# Patient Record
Sex: FEMALE
[De-identification: names, ages, dates, MRNs, and addresses within clinical notes are randomized per-mention and may not be internally consistent; named-entity substitution may affect disease eponyms.]

---

## 2024-08-16 PROBLEM — Z00.00 ENCOUNTER FOR PREVENTIVE HEALTH EXAMINATION: Status: ACTIVE | Noted: 2024-08-16

## 2024-08-20 ENCOUNTER — APPOINTMENT (OUTPATIENT)
Dept: OBGYN | Facility: CLINIC | Age: 74
End: 2024-08-20

## 2024-08-30 PROBLEM — N83.299 COMPLEX OVARIAN CYST: Status: ACTIVE | Noted: 2024-08-30

## 2024-09-05 ENCOUNTER — APPOINTMENT (OUTPATIENT)
Dept: GYNECOLOGIC ONCOLOGY | Facility: CLINIC | Age: 74
End: 2024-09-05
Payer: MEDICARE

## 2024-09-05 VITALS
SYSTOLIC BLOOD PRESSURE: 140 MMHG | OXYGEN SATURATION: 99 % | HEIGHT: 64 IN | BODY MASS INDEX: 25.78 KG/M2 | HEART RATE: 65 BPM | DIASTOLIC BLOOD PRESSURE: 63 MMHG | WEIGHT: 151 LBS

## 2024-09-05 DIAGNOSIS — Z80.3 FAMILY HISTORY OF MALIGNANT NEOPLASM OF BREAST: ICD-10-CM

## 2024-09-05 DIAGNOSIS — R79.89 OTHER SPECIFIED ABNORMAL FINDINGS OF BLOOD CHEMISTRY: ICD-10-CM

## 2024-09-05 DIAGNOSIS — Z80.1 FAMILY HISTORY OF MALIGNANT NEOPLASM OF TRACHEA, BRONCHUS AND LUNG: ICD-10-CM

## 2024-09-05 PROCEDURE — 99459 PELVIC EXAMINATION: CPT

## 2024-09-05 PROCEDURE — 99204 OFFICE O/P NEW MOD 45 MIN: CPT

## 2024-09-05 RX ORDER — TRAVOPROST 0.04 MG/ML
0 SOLUTION OPHTHALMIC
Refills: 0 | Status: ACTIVE | COMMUNITY

## 2024-09-05 RX ORDER — RUXOLITINIB 15 MG/G
1.5 CREAM TOPICAL
Refills: 0 | Status: ACTIVE | COMMUNITY

## 2024-09-11 NOTE — LETTER BODY
[Dear  ___] : Dear  [unfilled], [I had the pleasure of evaluating your patient, [unfilled] for ___] : I had the pleasure of evaluating your patient, [unfilled] for [unfilled]. [Attached please find my note.] : Attached please find my note. [FreeTextEntry1] : MRI

## 2024-09-11 NOTE — PHYSICAL EXAM
[Chaperone Present] : A chaperone was present in the examining room during all aspects of the physical examination [94072] : A chaperone was present during the pelvic exam. [FreeTextEntry2] : Cassandra [Normal] : Anus and perineum: Normal sphincter tone, no masses, no prolapse. [de-identified] : adnexa nonpalpable on exam

## 2024-09-11 NOTE — PHYSICAL EXAM
[Chaperone Present] : A chaperone was present in the examining room during all aspects of the physical examination [[id number]] : A chaperone was present during the pelvic exam. [FreeTextEntry2] : aCssandra [Normal] : Anus and perineum: Normal sphincter tone, no masses, no prolapse. [de-identified] : adnexa nonpalpable on exam

## 2024-09-11 NOTE — DISCUSSION/SUMMARY
[Reviewed Clinical Lab Test(s)] : Results of clinical tests were reviewed. [Reviewed Radiology Report(s)] : Radiology reports were reviewed. [Discuss Tests w/Referring Providers] : Results of labs/radiology studies and the treatment recommendations were discussed with performing/referring physician. [Discuss Alternatives/Risks/Benefits w/Patient] : All alternatives, risks, and benefits were discussed with the patient/family and all questions were answered.  Patient expressed good understanding and appreciates the importance of follow up as recommended. [FreeTextEntry1] : - The available diagnostic information and her history were reviewed in detail - documents she brought were reviewed in detail.  -  reviewed. - a pelvic MRI is ordered for further evaluation; final recs pending results - All questions were answered to her apparent satisfaction.

## 2024-09-11 NOTE — PHYSICAL EXAM
[Chaperone Present] : A chaperone was present in the examining room during all aspects of the physical examination [[id number]] : A chaperone was present during the pelvic exam. [FreeTextEntry2] : Cassandra [Normal] : Anus and perineum: Normal sphincter tone, no masses, no prolapse. [de-identified] : adnexa nonpalpable on exam

## 2024-09-11 NOTE — PHYSICAL EXAM
[Chaperone Present] : A chaperone was present in the examining room during all aspects of the physical examination [[id number]] : A chaperone was present during the pelvic exam. [FreeTextEntry2] : Cassandra [Normal] : Anus and perineum: Normal sphincter tone, no masses, no prolapse. [de-identified] : adnexa nonpalpable on exam

## 2024-09-11 NOTE — HISTORY OF PRESENT ILLNESS
show
[FreeTextEntry1] : Ms. ROONEY is a 74 year old  female, referred from Dr. Virk, for an elevated testosterone level and pssibly enlarging fibroids.   2023- TVUS- Uterus- 10.2 x 6.4 x 6.3 cm, multiple fibroids largest measuring 1.5 x 2.3 x 2.5 cm, EMS- 5mm, RTO- 1.5 x 1.3 x 1.2 cm, LTO removed   2024- Uterus- 10.7 x 6.1 cm, multiple fibroids, EMS- 2mm   RTO- not visualized    LTO- 2.0 x 2.0 x 1.0 cm   No free fluid or mass   2024-    Testosterone free- 12.5   Testosterone total- 112  PMHx-hx of bowel obstruction which resolved on its own, Hx of PCOS, borderline glaucoma and vitiligo  PSHx- myomectomy, appendectomy, LSO for ovarian torsion, and a right ovarian cystectomy  Family hx of cancer- father lung cancer, paternal aunt with breast cancer  She was on HRT from age [age] and once had endometrial sampling; however she has been off it about 5 years per patient.   She denies abdominal bloating/distention.  She denies a change in appetite, or a change in weight.  She is tolerating PO without nausea or vomiting.  She denies any change in bowel or bladder habits.  She denies any other associated signs or symptoms.  She is here to discuss further surgical management.   HM Pap- 2023- negative Mammo- 10/2023- negative Colonoscopy- - negative  Referred by (GYN) Dr. Jaelyn Virk PCP: Dr. Pace GI: Dr. Leach Pulmonologist: Dr. Hancock

## 2024-09-17 ENCOUNTER — RESULT REVIEW (OUTPATIENT)
Age: 74
End: 2024-09-17

## 2024-09-19 ENCOUNTER — APPOINTMENT (OUTPATIENT)
Dept: MRI IMAGING | Facility: CLINIC | Age: 74
End: 2024-09-19
Payer: MEDICARE

## 2024-09-19 ENCOUNTER — TRANSCRIPTION ENCOUNTER (OUTPATIENT)
Age: 74
End: 2024-09-19

## 2024-09-19 PROCEDURE — 72197 MRI PELVIS W/O & W/DYE: CPT | Mod: 26

## 2024-09-19 PROCEDURE — 74183 MRI ABD W/O CNTR FLWD CNTR: CPT | Mod: 26

## 2024-10-01 ENCOUNTER — APPOINTMENT (OUTPATIENT)
Dept: GYNECOLOGIC ONCOLOGY | Facility: CLINIC | Age: 74
End: 2024-10-01
Payer: MEDICARE

## 2024-10-01 DIAGNOSIS — N83.8 OTHER NONINFLAMMATORY DISORDERS OF OVARY, FALLOPIAN TUBE AND BROAD LIGAMENT: ICD-10-CM

## 2024-10-01 DIAGNOSIS — R79.89 OTHER SPECIFIED ABNORMAL FINDINGS OF BLOOD CHEMISTRY: ICD-10-CM

## 2024-10-01 PROCEDURE — 99215 OFFICE O/P EST HI 40 MIN: CPT

## 2024-10-02 NOTE — ASSESSMENT
[FreeTextEntry1] : 73y/o with elevated testosterone level and enlarged residual right ovary on imaging.

## 2024-10-02 NOTE — HISTORY OF PRESENT ILLNESS
[Home] : at home, [unfilled] , at the time of the visit. [Medical Office: ([facility])___] : at the medical office located in  [Verbal consent obtained from patient] : the patient, [unfilled] [FreeTextEntry1] : Ms. ROONEY is a 74 year old  female, referred by Dr. Virk for an elevated testosterone level and possibly enlarging fibroids.   2023- TVUS- Uterus- 10.2 x 6.4 x 6.3 cm, multiple fibroids largest measuring 1.5 x 2.3 x 2.5 cm, EMS- 5mm, RTO- 1.5 x 1.3 x 1.2 cm, LTO removed   2024- Uterus- 10.7 x 6.1 cm, multiple fibroids, EMS- 2mm   RTO- not visualized    LTO- 2.0 x 2.0 x 1.0 cm   No free fluid or mass   2024-    Testosterone free- 12.5   Testosterone total- 112  PMHx-hx of bowel obstruction admission  which resolved on its own, Hx of PCOS, borderline glaucoma and vitiligo  PSHx-  myomectomy,  C appendectomy, [facility]- LSO for ovarian torsion, and a right ovarian cystectomy  Family hx of cancer- father lung cancer, paternal aunt with breast cancer  She was on HRT from age [age] and once had endometrial sampling; however she has been off it about 5 years per patient.   24: Initial GYN ONC consultation - MRI ordered A/P to eval for adrenal mass and investigate right ovary/fibroids further.   24 MRI A/P: FINDINGS: LOWER CHEST: Within normal limits. LIVER: Within normal limits. BILE DUCTS: Normal caliber. GALLBLADDER: Within normal limits. SPLEEN: Within normal limits. PANCREAS: Within normal limits. ADRENALS: Normal in appearance. No adrenal mass. KIDNEYS/URETERS: Right renal cysts. BLADDER: Within normal limits. REPRODUCTIVE ORGANS: Multiple intramural, subserosal and submucosal fibroids with the largest a subserosal anterior fibroid measuring 3.8 cm. Endometrium is normal in thickness. Right ovary is enlarged for age measuring 2.0 x 1.4 x 3.4 cm. No definitive mass lesion. Left ovary is absent. BOWEL: No bowel obstruction. PERITONEUM: No ascites. VESSELS: Within normal limits. RETROPERITONEUM/LYMPH NODES: No lymphadenopathy. ABDOMINAL WALL: Within normal limits. BONES: Within normal limits. IMPRESSION: No evidence of adrenal mass. Right ovary is enlarged for age however without definitive mass lesion. Correlate with any prior studies available for stability.  She has mild chronic abdominal bloating She denies VB or VD. She denies any change in bowel or bladder habits.  She denies any other associated signs or symptoms.   She opted for a telehealth discussion visit today to review results and recommendations.    HM Pap- 2023- negative Mammo- 10/2023- negative Colonoscopy- - negative; due   Referred by (GYN) Dr. Jaelyn Virk; saw Dr. Brian Miranda (GYN) for second opinion 2024 PCP: Dr. Tyree Pace GI: Dr. Leach Pulmonologist: Dr. Fredy Hancock Previous saw: Dr. Larry Hand GYN ONC

## 2024-10-02 NOTE — HISTORY OF PRESENT ILLNESS
[Home] : at home, [unfilled] , at the time of the visit. [Medical Office: ([facility])___] : at the medical office located in  [Verbal consent obtained from patient] : the patient, [unfilled] [FreeTextEntry1] : Ms. ROONEY is a 74 year old  female, referred by Dr. Virk for an elevated testosterone level and possibly enlarging fibroids.   2023- TVUS- Uterus- 10.2 x 6.4 x 6.3 cm, multiple fibroids largest measuring 1.5 x 2.3 x 2.5 cm, EMS- 5mm, RTO- 1.5 x 1.3 x 1.2 cm, LTO removed   2024- Uterus- 10.7 x 6.1 cm, multiple fibroids, EMS- 2mm   RTO- not visualized    LTO- 2.0 x 2.0 x 1.0 cm   No free fluid or mass   2024-    Testosterone free- 12.5   Testosterone total- 112  PMHx-hx of bowel obstruction admission  which resolved on its own, Hx of PCOS, borderline glaucoma and vitiligo  PSHx-  myomectomy,  C appendectomy, [facility]- LSO for ovarian torsion, and a right ovarian cystectomy  Family hx of cancer- father lung cancer, paternal aunt with breast cancer  She was on HRT from age [age] and once had endometrial sampling; however she has been off it about 5 years per patient.   24: Initial GYN ONC consultation - MRI ordered A/P to eval for adrenal mass and investigate right ovary/fibroids further.   24 MRI A/P: FINDINGS: LOWER CHEST: Within normal limits. LIVER: Within normal limits. BILE DUCTS: Normal caliber. GALLBLADDER: Within normal limits. SPLEEN: Within normal limits. PANCREAS: Within normal limits. ADRENALS: Normal in appearance. No adrenal mass. KIDNEYS/URETERS: Right renal cysts. BLADDER: Within normal limits. REPRODUCTIVE ORGANS: Multiple intramural, subserosal and submucosal fibroids with the largest a subserosal anterior fibroid measuring 3.8 cm. Endometrium is normal in thickness. Right ovary is enlarged for age measuring 2.0 x 1.4 x 3.4 cm. No definitive mass lesion. Left ovary is absent. BOWEL: No bowel obstruction. PERITONEUM: No ascites. VESSELS: Within normal limits. RETROPERITONEUM/LYMPH NODES: No lymphadenopathy. ABDOMINAL WALL: Within normal limits. BONES: Within normal limits. IMPRESSION: No evidence of adrenal mass. Right ovary is enlarged for age however without definitive mass lesion. Correlate with any prior studies available for stability.  She has mild chronic abdominal bloating She denies VB or VD. She denies any change in bowel or bladder habits.  She denies any other associated signs or symptoms.   She opted for a telehealth discussion visit today to review results and recommendations.    HM Pap- 2023- negative Mammo- 10/2023- negative Colonoscopy- - negative; due   Referred by (GYN) Dr. Jaelyn Virk; saw Dr. Brian Miranda (GYN) for second opinion 2024 PCP: Dr. Tyree Pace GI: Dr. Leach Pulmonologist: Dr. Fredy Hancock Previous saw: Dr. Larry Hand GYN ONC

## 2024-10-02 NOTE — DISCUSSION/SUMMARY
[Reviewed Clinical Lab Test(s)] : Results of clinical tests were reviewed. [Reviewed Radiology Report(s)] : Radiology reports were reviewed. [Discuss Tests w/Referring Providers] : Results of labs/radiology studies and the treatment recommendations were discussed with performing/referring physician. [Discuss Alternatives/Risks/Benefits w/Patient] : All alternatives, risks, and benefits were discussed with the patient/family and all questions were answered.  Patient expressed good understanding and appreciates the importance of follow up as recommended. [FreeTextEntry1] : - MRI results reviewed in detail with patient - the differential diagnosis was again reviewed- benign and malignant etiologies discussed. - HM reviewed. - discussed her issues with her 's care while she is recovering.  - we reviewed the differential diagnosis and management of the solid ovarian lesion, including benign  and malignant etiologies.   She is a candidate for an attempted minimally invasive approach with robotic-assisted laparoscopic RSO, possible staging with hysterectomy. Rationale for concurrent D&C was discussed. The nature of the procedure was described in detail.  The risk of conversion to laparotomy for various indications was discussed. I explained that the risk of adhesions, resultant complications and/or laparotomy is elevated due to her previous surgery. I explained that her uterine size would possibly require a vertical midline incision if a hysterectomy were warranted based on intraop findings. The limitations of frozen section diagnosis were discussed. Risks of surgery were discussed in detail, including but not limited to bleeding, blood transfusion, infection, injury to adjacent organs and the potential need for further procedures in the future to treat various postoperative complications. The potential need for postoperative adjuvant therapy in the event of a malignancy was discussed. Perioperative and recuperative issues were addressed in great detail.  She expressed her understanding of all of these issues and opted to proceed with surgery.  All questions were answered to her apparent satisfaction.

## 2024-10-15 RX ORDER — METRONIDAZOLE 500 MG/1
500 TABLET ORAL
Qty: 2 | Refills: 0 | Status: ACTIVE | COMMUNITY
Start: 1900-01-01 | End: 1900-01-01

## 2024-10-15 RX ORDER — NEOMYCIN SULFATE 500 MG/1
500 TABLET ORAL
Qty: 2 | Refills: 0 | Status: ACTIVE | COMMUNITY
Start: 1900-01-01 | End: 1900-01-01

## 2024-10-18 ENCOUNTER — OUTPATIENT (OUTPATIENT)
Dept: OUTPATIENT SERVICES | Facility: HOSPITAL | Age: 74
LOS: 1 days | End: 2024-10-18

## 2024-10-18 VITALS
WEIGHT: 154.1 LBS | SYSTOLIC BLOOD PRESSURE: 129 MMHG | TEMPERATURE: 97 F | DIASTOLIC BLOOD PRESSURE: 79 MMHG | OXYGEN SATURATION: 97 % | RESPIRATION RATE: 16 BRPM | HEART RATE: 79 BPM | HEIGHT: 60.75 IN

## 2024-10-18 DIAGNOSIS — Z98.890 OTHER SPECIFIED POSTPROCEDURAL STATES: Chronic | ICD-10-CM

## 2024-10-18 DIAGNOSIS — R79.89 OTHER SPECIFIED ABNORMAL FINDINGS OF BLOOD CHEMISTRY: ICD-10-CM

## 2024-10-18 DIAGNOSIS — N83.8 OTHER NONINFLAMMATORY DISORDERS OF OVARY, FALLOPIAN TUBE AND BROAD LIGAMENT: ICD-10-CM

## 2024-10-18 DIAGNOSIS — Z90.721 ACQUIRED ABSENCE OF OVARIES, UNILATERAL: Chronic | ICD-10-CM

## 2024-10-18 LAB
A1C WITH ESTIMATED AVERAGE GLUCOSE RESULT: 5.7 % — HIGH (ref 4–5.6)
APPEARANCE UR: CLEAR — SIGNIFICANT CHANGE UP
BASOPHILS # BLD AUTO: 0.04 K/UL — SIGNIFICANT CHANGE UP (ref 0–0.2)
BASOPHILS NFR BLD AUTO: 0.7 % — SIGNIFICANT CHANGE UP (ref 0–2)
BILIRUB UR-MCNC: NEGATIVE — SIGNIFICANT CHANGE UP
BLD GP AB SCN SERPL QL: NEGATIVE — SIGNIFICANT CHANGE UP
COLOR SPEC: YELLOW — SIGNIFICANT CHANGE UP
DIFF PNL FLD: NEGATIVE — SIGNIFICANT CHANGE UP
EOSINOPHIL # BLD AUTO: 0.4 K/UL — SIGNIFICANT CHANGE UP (ref 0–0.5)
EOSINOPHIL NFR BLD AUTO: 6.7 % — HIGH (ref 0–6)
ESTIMATED AVERAGE GLUCOSE: 117 — SIGNIFICANT CHANGE UP
GLUCOSE UR QL: NEGATIVE MG/DL — SIGNIFICANT CHANGE UP
HCT VFR BLD CALC: 43.9 % — SIGNIFICANT CHANGE UP (ref 34.5–45)
HGB BLD-MCNC: 14.5 G/DL — SIGNIFICANT CHANGE UP (ref 11.5–15.5)
IMM GRANULOCYTES NFR BLD AUTO: 0.3 % — SIGNIFICANT CHANGE UP (ref 0–0.9)
KETONES UR-MCNC: NEGATIVE MG/DL — SIGNIFICANT CHANGE UP
LEUKOCYTE ESTERASE UR-ACNC: NEGATIVE — SIGNIFICANT CHANGE UP
LYMPHOCYTES # BLD AUTO: 2.48 K/UL — SIGNIFICANT CHANGE UP (ref 1–3.3)
LYMPHOCYTES # BLD AUTO: 41.8 % — SIGNIFICANT CHANGE UP (ref 13–44)
MCHC RBC-ENTMCNC: 30 PG — SIGNIFICANT CHANGE UP (ref 27–34)
MCHC RBC-ENTMCNC: 33 GM/DL — SIGNIFICANT CHANGE UP (ref 32–36)
MCV RBC AUTO: 90.7 FL — SIGNIFICANT CHANGE UP (ref 80–100)
MONOCYTES # BLD AUTO: 0.61 K/UL — SIGNIFICANT CHANGE UP (ref 0–0.9)
MONOCYTES NFR BLD AUTO: 10.3 % — SIGNIFICANT CHANGE UP (ref 2–14)
NEUTROPHILS # BLD AUTO: 2.38 K/UL — SIGNIFICANT CHANGE UP (ref 1.8–7.4)
NEUTROPHILS NFR BLD AUTO: 40.2 % — LOW (ref 43–77)
NITRITE UR-MCNC: NEGATIVE — SIGNIFICANT CHANGE UP
PH UR: 6 — SIGNIFICANT CHANGE UP (ref 5–8)
PLATELET # BLD AUTO: 331 K/UL — SIGNIFICANT CHANGE UP (ref 150–400)
PROT UR-MCNC: NEGATIVE MG/DL — SIGNIFICANT CHANGE UP
RBC # BLD: 4.84 M/UL — SIGNIFICANT CHANGE UP (ref 3.8–5.2)
RBC # FLD: 14.8 % — HIGH (ref 10.3–14.5)
RH IG SCN BLD-IMP: POSITIVE — SIGNIFICANT CHANGE UP
RH IG SCN BLD-IMP: POSITIVE — SIGNIFICANT CHANGE UP
SP GR SPEC: 1.01 — SIGNIFICANT CHANGE UP (ref 1–1.03)
UROBILINOGEN FLD QL: 0.2 MG/DL — SIGNIFICANT CHANGE UP (ref 0.2–1)
WBC # BLD: 5.93 K/UL — SIGNIFICANT CHANGE UP (ref 3.8–10.5)
WBC # FLD AUTO: 5.93 K/UL — SIGNIFICANT CHANGE UP (ref 3.8–10.5)

## 2024-10-18 NOTE — H&P PST ADULT - NSICDXFAMILYHX_GEN_ALL_CORE_FT
FAMILY HISTORY:  Father  Still living? Unknown  FHx: lung cancer, Age at diagnosis: Age Unknown    Aunt  Still living? Unknown  FHx: breast cancer, Age at diagnosis: Age Unknown

## 2024-10-18 NOTE — H&P PST ADULT - NSICDXPASTSURGICALHX_GEN_ALL_CORE_FT
PAST SURGICAL HISTORY:  H/O: myomectomy     History of appendectomy     History of left oophorectomy

## 2024-10-18 NOTE — H&P PST ADULT - PROBLEM SELECTOR PLAN 1
Patient tentatively scheduled for    Pre-op instructions provided.  Famotidine provided with instructions. Hibiclens provided with instructions and was signed by patient. Teach-back method was utilized to assess patient's understanding. Patient verbalized understanding.    ordered CBC, A1c, Type/Retype , UA and UCx    copy BMP in the chart Patient tentatively scheduled for  robotic right salpingo oophorectomy dilation and curettage possible hysterectomy staging laparotomy cystoscopy   ?  Pre-op instructions provided.  Famotidine provided with instructions. Hibiclens provided with instructions and was signed by patient. Teach-back method was utilized to assess patient's understanding. Patient verbalized understanding.    ordered CBC, A1c, Type/Retype , UA and UCx    copy BMP in the chart

## 2024-10-18 NOTE — H&P PST ADULT - RESPIRATORY
normal/clear to auscultation bilaterally/no use of accessory muscles/breath sounds equal/good air movement

## 2024-10-18 NOTE — H&P PST ADULT - HISTORY OF PRESENT ILLNESS
Ms. Ortiz  is a 74 year old with hx of PCOS she was referred to Dr Augustine by DR Jaelyn mc for an elevated  and possibly enlarging fibroids.  ?  9/2023- TVUS- Uterus- 10.2 x 6.4 x 6.3 cm, multiple fibroids largest measuring 1.5 x 2.3 x 2.5 cm, EMS- 5mm, RTO- 1.5 x 1.3 x 1.2 cm, LTO removed    7/2024- Uterus- 10.7 x 6.1 cm, multiple fibroids, EMS- 2mm  RTO- not visualized  LTO- 2.0 x 2.0 x 1.0 cm  No free fluid or mass  ?  7/2024-  Testosterone free- 12.5  Testosterone total- 112  ?  PMHx-hx of bowel obstruction admission 2007 which resolved on its own, Hx of PCOS, borderline glaucoma and vitiligo  PSHx- 1996 myomectomy, 2009 C appendectomy,1975 [facility]- LSO for ovarian torsion, and a right ovarian cystectomy  Family hx of cancer- father lung cancer, paternal aunt with breast cancer  ? Ms. Ortiz  is a 74 year old with hx of PCOS she was referred to Dr Augustine by DR Jaelyn mc for an elevated  and possibly enlarging fibroids.  9/2023- TVUS- Uterus- 10.2 x 6.4 x 6.3 cm, multiple fibroids largest measuring 1.5 x 2.3 x 2.5 cm, EMS- 5mm, RTO- 1.5 x 1.3 x 1.2 cm, LTO removed    7/2024- Uterus- 10.7 x 6.1 cm, multiple fibroids, EMS- 2mm  RTO- not visualized  LTO- 2.0 x 2.0 x 1.0 cm  No free fluid or mass    Patient is scheduled for robotic right salpingo oophorectomy dilation and curettage possible hysterectomy staging laparotomy cystoscopy   ? Ms. Ortiz  is a 74 year old with hx of PCOS she was referred to Dr Augustine by DR Jaelyn mc for an elevated Testerone levels and possibly enlarging fibroids.  9/2023- TVUS- Uterus- 10.2 x 6.4 x 6.3 cm, multiple fibroids largest measuring 1.5 x 2.3 x 2.5 cm, EMS- 5mm, RTO- 1.5 x 1.3 x 1.2 cm, LTO removed    7/2024- Uterus- 10.7 x 6.1 cm, multiple fibroids, EMS- 2mm    Patient is scheduled for robotic right salpingo oophorectomy dilation and curettage possible hysterectomy staging laparotomy cystoscopy   ?

## 2024-10-18 NOTE — H&P PST ADULT - NS PRO AD PATIENT TYPE
For vitamin D supplement:    To order supplements go to the web site: [employer]  Use my authorization number to order the recommended supplements: S99     I would stay on the same dose as you have been taking as our recent level look good.    [employer] - probiotics for skin to help with acne  
Health Care Proxy (HCP)

## 2024-10-18 NOTE — H&P PST ADULT - NSANTHOSAYNRD_GEN_A_CORE
No. JOSE JUAN screening performed.  STOP BANG Legend: 0-2 = LOW Risk; 3-4 = INTERMEDIATE Risk; 5-8 = HIGH Risk

## 2024-10-18 NOTE — H&P PST ADULT - NSICDXPASTMEDICALHX_GEN_ALL_CORE_FT
PAST MEDICAL HISTORY:  Elevated testosterone level     Glaucoma     History of small bowel obstruction     PCOS (polycystic ovarian syndrome)     Torsion of left ovary     Uterine fibroid     Vitiligo

## 2024-10-19 LAB
CULTURE RESULTS: SIGNIFICANT CHANGE UP
SPECIMEN SOURCE: SIGNIFICANT CHANGE UP

## 2024-10-31 NOTE — ASU PATIENT PROFILE, ADULT - NS PRO ABUSE SCREEN AFRAID ANYONE YN
Prior to admission pt reports being independent of all ADL's & functional mobility without AD. Pt resides in house with spouse, 4 steps to enter, 1 flight to bedroom. Pt has tub. no

## 2024-10-31 NOTE — ASU PATIENT PROFILE, ADULT - FALL HARM RISK - TYPE OF ASSESSMENT
Refill request received for tizanidine  No protocol     Last office visit: 9/27/2023  Next office visit: 4/3/2024  Last refill: 01/11/2024 #90  Last labs: 02/02/2024    Can not refill without MD authorization   Admission

## 2024-11-01 ENCOUNTER — TRANSCRIPTION ENCOUNTER (OUTPATIENT)
Age: 74
End: 2024-11-01

## 2024-11-01 ENCOUNTER — OUTPATIENT (OUTPATIENT)
Dept: INPATIENT UNIT | Facility: HOSPITAL | Age: 74
LOS: 1 days | Discharge: HOME | End: 2024-11-01
Payer: MEDICARE

## 2024-11-01 ENCOUNTER — RESULT REVIEW (OUTPATIENT)
Age: 74
End: 2024-11-01

## 2024-11-01 ENCOUNTER — APPOINTMENT (OUTPATIENT)
Dept: GYNECOLOGIC ONCOLOGY | Facility: HOSPITAL | Age: 74
End: 2024-11-01

## 2024-11-01 VITALS
SYSTOLIC BLOOD PRESSURE: 130 MMHG | DIASTOLIC BLOOD PRESSURE: 91 MMHG | RESPIRATION RATE: 16 BRPM | WEIGHT: 154.1 LBS | TEMPERATURE: 98 F | HEIGHT: 60.75 IN | HEART RATE: 73 BPM | OXYGEN SATURATION: 98 %

## 2024-11-01 VITALS
RESPIRATION RATE: 16 BRPM | OXYGEN SATURATION: 100 % | DIASTOLIC BLOOD PRESSURE: 69 MMHG | HEART RATE: 81 BPM | SYSTOLIC BLOOD PRESSURE: 139 MMHG

## 2024-11-01 DIAGNOSIS — R79.89 OTHER SPECIFIED ABNORMAL FINDINGS OF BLOOD CHEMISTRY: ICD-10-CM

## 2024-11-01 DIAGNOSIS — Z98.890 OTHER SPECIFIED POSTPROCEDURAL STATES: Chronic | ICD-10-CM

## 2024-11-01 DIAGNOSIS — Z90.721 ACQUIRED ABSENCE OF OVARIES, UNILATERAL: Chronic | ICD-10-CM

## 2024-11-01 LAB — GLUCOSE BLDC GLUCOMTR-MCNC: 106 MG/DL — HIGH (ref 70–99)

## 2024-11-01 PROCEDURE — 58120 DILATION AND CURETTAGE: CPT

## 2024-11-01 PROCEDURE — S2900 ROBOTIC SURGICAL SYSTEM: CPT | Mod: NC

## 2024-11-01 PROCEDURE — 44050 REDUCE BOWEL OBSTRUCTION: CPT

## 2024-11-01 PROCEDURE — 88112 CYTOPATH CELL ENHANCE TECH: CPT | Mod: 26

## 2024-11-01 PROCEDURE — 88305 TISSUE EXAM BY PATHOLOGIST: CPT | Mod: 26

## 2024-11-01 PROCEDURE — 58661 LAPAROSCOPY REMOVE ADNEXA: CPT | Mod: RT

## 2024-11-01 DEVICE — VISTASEAL FIBRIN HUMAN 10ML: Type: IMPLANTABLE DEVICE | Status: FUNCTIONAL

## 2024-11-01 RX ORDER — RUXOLITINIB 15 MG/G
1 CREAM TOPICAL
Refills: 0 | DISCHARGE

## 2024-11-01 RX ORDER — IBUPROFEN 200 MG
1 TABLET ORAL
Qty: 0 | Refills: 0 | DISCHARGE

## 2024-11-01 RX ORDER — BIOTIN
0 POWDER (GRAM) MISCELLANEOUS
Refills: 0 | DISCHARGE

## 2024-11-01 RX ORDER — TRAVOPROST (BENZALKONIUM) 0.004 %
1 DROPS OPHTHALMIC (EYE)
Refills: 0 | DISCHARGE

## 2024-11-01 RX ORDER — HYDROMORPHONE HCL/0.9% NACL/PF 6 MG/30 ML
0.5 PATIENT CONTROLLED ANALGESIA SYRINGE INTRAVENOUS
Refills: 0 | Status: DISCONTINUED | OUTPATIENT
Start: 2024-11-01 | End: 2024-11-01

## 2024-11-01 RX ORDER — ONDANSETRON HYDROCHLORIDE 2 MG/ML
4 INJECTION, SOLUTION INTRAMUSCULAR; INTRAVENOUS ONCE
Refills: 0 | Status: ACTIVE | OUTPATIENT
Start: 2024-11-01 | End: 2025-09-30

## 2024-11-01 RX ORDER — ANTACID TABLETS 500 MG/1
0 TABLET, CHEWABLE ORAL
Refills: 0 | DISCHARGE

## 2024-11-01 RX ORDER — ACETAMINOPHEN 500 MG
3 TABLET ORAL
Qty: 0 | Refills: 0 | DISCHARGE

## 2024-11-01 RX ORDER — CHLORHEXIDINE GLUCONATE 40 MG/ML
1 SOLUTION TOPICAL ONCE
Refills: 0 | Status: COMPLETED | OUTPATIENT
Start: 2024-11-01 | End: 2024-11-01

## 2024-11-01 RX ORDER — ERGOCALCIFEROL 1.25 MG/1
0 CAPSULE ORAL
Refills: 0 | DISCHARGE

## 2024-11-01 RX ORDER — OXYCODONE HYDROCHLORIDE 30 MG/1
1 TABLET ORAL
Qty: 8 | Refills: 0
Start: 2024-11-01

## 2024-11-01 NOTE — BRIEF OPERATIVE NOTE - NSICDXBRIEFPROCEDURE_GEN_ALL_CORE_FT
PROCEDURES:  Exam under anesthesia, pelvic 01-Nov-2024 12:37:18  Ng, Chaparrita  Dilation and curettage with polypectomy 01-Nov-2024 12:38:56  Ng, Chaparrita  Robot-assisted right salpingo-oophorectomy 01-Nov-2024 12:39:29  Ng, Chaparrita  Lysis of adhesions, pelvic 01-Nov-2024 12:39:41  Ng, Chaparrita

## 2024-11-01 NOTE — BRIEF OPERATIVE NOTE - SPECIMENS
Right ovary and fallopian tube, EMC, endometrial polyp Right ovary and fallopian tube, EMC, cervical polyp

## 2024-11-01 NOTE — ASU DISCHARGE PLAN (ADULT/PEDIATRIC) - ASU DC SPECIAL INSTRUCTIONSFT
Postoperative Instructions      Pain control     For pain control, take the followin. Motrin 600mg every 6 hours with food  2. Also take Tylenol 975mg every 6 hours. Alternate Motrin and Tylenol every 3 hours.  3. Add oxycodone as needed for severe pain not managed well by Motrin and Tylenol. A prescription has been sent to your pharmacy on file.     Motrin and Tylenol can be obtained over the counter.    Incision Care  Keep your incision(s) clean and dry. It is ok to shower, but do not scrub the incision sites--just allow the water to gently wash over your skin. Remove the outer dressing(s)--the band-aids--the second day after your surgery. There are small pieces of tape called steri-strips over the incisions underneath these dressings. Steri strips will be removed at your postoperative appointment.    Postoperative restrictions   Do not drive or make important decisions for 24 hours after anesthesia. You may feel drowsy for 24 hours and should have a responsible adult with you during that time. Nothing in the vagina (tampons, sexual intercourse), No tub baths, pools or hot tubs for 4 weeks (showers are ok!). No lifting anything heavier than 15 lbs, no strenuous exercise for 4 weeks after surgery. Do not pull or cut any stitches that you see around your incision.       Vaginal bleeding   Spotting and intermittent passage of blood clots per vagina is normal in first few weeks after surgery. If you are soaking 1 pad per hour, that is not normal and you should notify Dr. Augustine's office and seek medical attention right away.      Vaginal discharge   Vaginal discharge (all colors) is normal after vaginal surgery. This is normal.    Signs of Infection  Some postoperative pain and discomfort is normal. However, if you experience any of the following, you may be developing an infection and should be seen by your doctor: pain that does not get better with the oral medications listed above, fever greater than 100.4F, foul smelling discharge coming from the surgical site, nausea and vomiting that does not stop (especially if you are unable to tolerate oral intake), or inability to urinate. If you experience any of these symptoms, call your doctor's office to be seen right away.    Follow Up  Attend your postoperative appointment with Dr. Augustine  at 10A.  The results from the procedure will be discussed with you at that time.

## 2024-11-01 NOTE — CHART NOTE - NSCHARTNOTEFT_GEN_A_CORE
Gynecological Oncology PA Post Op Note    Patient seen and examined at bedside without complaints. Patient ambulated to bathroom, voided 400cc. Patient denies headache, dizziness, nausea, vomiting, chest pain, palpitations and sob. Patient is tolerating crackers and ginger ale.       Vital Signs Last 24 Hours  T(C): 36.2 (11-01-24 @ 13:15), Max: 36.7 (11-01-24 @ 06:10)  HR: 81 (11-01-24 @ 13:30) (72 - 82)  BP: 139/69 (11-01-24 @ 13:30) (103/71 - 139/69)  RR: 16 (11-01-24 @ 13:30) (5 - 19)  SpO2: 100% (11-01-24 @ 13:30) (98% - 100%)    I&O's Summary    01 Nov 2024 07:01  -  01 Nov 2024 13:58  --------------------------------------------------------  IN: 790 mL / OUT: 400 mL / NET: 390 mL        Physical Exam:  General: NAD  CV: RRR  Lungs: bilaterally CTA  Abdomen: soft, non-distended, appropriately tender; scope sites C/D/I  Ext: No pain or swelling. Bilat venodynes in place.    Labs:      MEDICATIONS  (STANDING):  lactated ringers. 1000 milliLiter(s) (125 mL/Hr) IV Continuous <Continuous>    MEDICATIONS  (PRN):  HYDROmorphone  Injectable 0.5 milliGRAM(s) IV Push every 10 minutes PRN Moderate Pain (4 - 6)  ondansetron Injectable 4 milliGRAM(s) IV Push once PRN Nausea and/or Vomiting    Assessment/Plan:  75 yo female s/p Robot-assisted right salpingo-oophorectomy for fibroid uterus(frozen: benign); also s/p reduction of internal hernia (Dr. Lazaro)  recovering well in acute post-operative state. EBL: 25mL.See operative note for details  -Patient is meeting all PACU milestones for discharge. Discharge instructions reviewed.  Follow up with Dr. Augustine in 2 weeks.      Carlos A Reddy PA-C  #[id number]

## 2024-11-01 NOTE — ASU DISCHARGE PLAN (ADULT/PEDIATRIC) - FINANCIAL ASSISTANCE
[facility] provides services at a reduced cost to those who are determined to be eligible through [facility]’s financial assistance program. Information regarding [facility]’s financial assistance program can be found by going to [email or web address] or by calling [phone].

## 2024-11-01 NOTE — ASU PREOP CHECKLIST - PATIENT'S PERSONAL PROPERTY GIVEN TO
[Follow Up] : a follow up visit [Patient] : patient [Father] : father [FreeTextEntry1] : scoliosis with TLSO brace With Kacy./on unit

## 2024-11-01 NOTE — ASU DISCHARGE PLAN (ADULT/PEDIATRIC) - CARE PROVIDER_API CALL
Anticipated Discharge Disposition: [facility]    Action: Faxed D/C Summary to [facility] told patient he has been accepted and we are looking at d/c at 11:00.  PC to patient's spouse, spoke to patient's daughter Ryanne to give her an update of d/c time, location.  Faxed transport communication/[facility] to [facility] for a tentative transport time of 11:00 on 7/9    Barriers to Discharge: n/a    Plan: n/a     Merle Augustine  Gynecologic Oncology  [address] [place] [address]  Phone: [phone]  Fax: [phone]  Scheduled Appointment: 11/21/2024 10:00 AM   Simple: Patient demonstrates quick and easy understanding

## 2024-11-01 NOTE — ASU DISCHARGE PLAN (ADULT/PEDIATRIC) - PROCEDURE
Dilation and curettage, Robotic assisted laparoscopic right salpingo oophorectomy, lysis of adhesions, reduction of internal hernia

## 2024-11-01 NOTE — BRIEF OPERATIVE NOTE - OPERATION/FINDINGS
Exam under anesthesia:   - normal external genitalia  - 15 week size anteverted bulky uterus, immobile  - vaginal mucosa and cervix appeared normal  - Endocervical polyp    Intraabdominal findings:  - ~10cm fibroid uterus  - left fallopian tube and ovary surgically absent  - adhesions in the RLQ connecting bowel to peritoneum  - adhesions of the right adnexa to bowel  - normal upper abdominal survey with smooth liver edge    Methylene blue backfilled into the bladder without any extravasation at the conclusion of the case    Frozen section: germ cell tumor Exam under anesthesia:   - normal external genitalia  - anteverted bulky uterus, limited mobility  - vaginal mucosa and cervix appeared normal  - Endocervical polyp    Intraabdominal findings:  - ~10cm fibroid uterus  - left fallopian tube and ovary surgically absent  - adhesions in the RLQ from bowel to right ovary/peritoneum  - normal upper abdominal survey     Frozen section: germ cell tumor, 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

## 2024-11-04 ENCOUNTER — NON-APPOINTMENT (OUTPATIENT)
Age: 74
End: 2024-11-04

## 2024-11-04 LAB — NON-GYNECOLOGICAL CYTOLOGY STUDY: SIGNIFICANT CHANGE UP

## 2024-11-05 ENCOUNTER — NON-APPOINTMENT (OUTPATIENT)
Age: 74
End: 2024-11-05

## 2024-11-07 LAB — SURGICAL PATHOLOGY STUDY: SIGNIFICANT CHANGE UP

## 2024-11-21 ENCOUNTER — NON-APPOINTMENT (OUTPATIENT)
Age: 74
End: 2024-11-21

## 2024-11-21 ENCOUNTER — APPOINTMENT (OUTPATIENT)
Dept: GYNECOLOGIC ONCOLOGY | Facility: CLINIC | Age: 74
End: 2024-11-21
Payer: MEDICARE

## 2024-11-21 VITALS
DIASTOLIC BLOOD PRESSURE: 87 MMHG | WEIGHT: 152 LBS | TEMPERATURE: 97.3 F | HEIGHT: 64 IN | SYSTOLIC BLOOD PRESSURE: 150 MMHG | BODY MASS INDEX: 25.95 KG/M2 | OXYGEN SATURATION: 96 % | HEART RATE: 76 BPM

## 2024-11-21 DIAGNOSIS — R79.89 OTHER SPECIFIED ABNORMAL FINDINGS OF BLOOD CHEMISTRY: ICD-10-CM

## 2024-11-21 DIAGNOSIS — N83.8 OTHER NONINFLAMMATORY DISORDERS OF OVARY, FALLOPIAN TUBE AND BROAD LIGAMENT: ICD-10-CM

## 2024-11-21 PROCEDURE — 99024 POSTOP FOLLOW-UP VISIT: CPT

## 2025-03-18 ENCOUNTER — APPOINTMENT (OUTPATIENT)
Dept: HEMATOLOGY ONCOLOGY | Facility: CLINIC | Age: 75
End: 2025-03-18

## 2025-03-31 ENCOUNTER — NON-APPOINTMENT (OUTPATIENT)
Age: 75
End: 2025-03-31

## (undated) DEVICE — SOL IRR POUR NS 0.9% 500ML

## (undated) DEVICE — VENODYNE/SCD SLEEVE CALF MEDIUM

## (undated) DEVICE — LUBRICATING JELLY ONESHOT 1.25OZ

## (undated) DEVICE — XI ARM NEEDLE DRIVER LARGE

## (undated) DEVICE — SPECIMEN CONTAINER 100ML

## (undated) DEVICE — TUBING IRR SET FOR CYSTOSCOPY 77"

## (undated) DEVICE — LAPSAC SURGICAL TISSUE POUCH 8X10"

## (undated) DEVICE — INZII RETRIEVAL SYSTEM 5MM

## (undated) DEVICE — GLV 6 PROTEXIS (WHITE)

## (undated) DEVICE — DRSG MASTISOL

## (undated) DEVICE — PACK PERI GYN

## (undated) DEVICE — UTERINE MANIPULATOR CONMED VCARE SM 32MM

## (undated) DEVICE — UTERINE MANIPULATOR CONMED VCARE MED 34MM

## (undated) DEVICE — XI DRAPE COLUMN

## (undated) DEVICE — FOLEY TRAY 16FR 5CC LTX UMETER CLOSED

## (undated) DEVICE — D HELP - CLEARVIEW CLEARIFY SYSTEM

## (undated) DEVICE — SUT VICRYL PLUS 0 27" CT-3

## (undated) DEVICE — SUT VICRYL 0 27" UR-6

## (undated) DEVICE — TROCAR SURGIQUEST AIRSEAL 5MM X 100MM

## (undated) DEVICE — UTERINE MANIPULATOR COOPER SURGICAL 5MM 33CM GREEN

## (undated) DEVICE — ELCTR BOVIE PENCIL SMOKE EVACUATION

## (undated) DEVICE — DRAPE SOL WARMING 44X44IN

## (undated) DEVICE — SUT POLYSORB 0 60" TIES UNDYED

## (undated) DEVICE — TUBING AIRSEAL TRI-LUMEN FILTERED

## (undated) DEVICE — NDL COUNTER FOAM AND MAGNET 10-20

## (undated) DEVICE — ELCTR GROUNDING PAD ADULT COVIDIEN

## (undated) DEVICE — SUT MONOCRYL 4-0 27" PS-2 UNDYED

## (undated) DEVICE — PACK D&C

## (undated) DEVICE — APPLICATOR VISTASEAL LAP DUAL 45CM FLEX

## (undated) DEVICE — XI OBTURATOR OPTICAL BLADELESS 8MM

## (undated) DEVICE — TUBING STRYKEFLOW II SUCTION / IRRIGATOR

## (undated) DEVICE — UTERINE MANIPULATOR CONMED VCARE LG 37MM

## (undated) DEVICE — XI DRAPE ARM

## (undated) DEVICE — GLV 6.5 PROTEXIS (BLUE)

## (undated) DEVICE — NDL HYPO REGULAR BEVEL 25G X 1.5" (BLUE)

## (undated) DEVICE — SYR ASEPTO

## (undated) DEVICE — WARMING BLANKET UPPER ADULT

## (undated) DEVICE — XI ARM NEEDLE DRIVER SUTURECUT MEGA 8MM

## (undated) DEVICE — PREP BETADINE KIT

## (undated) DEVICE — XI SEAL UNIVERSIAL 5-12MM

## (undated) DEVICE — XI ARM FORCEP PROGRASP 8MM

## (undated) DEVICE — XI ARM FORCEP FENESTRATED BIPOLAR 8MM

## (undated) DEVICE — XI ARM SCISSOR MONO CURVED

## (undated) DEVICE — XI TIP COVER

## (undated) DEVICE — POSITIONER PURPLE ARM ONE STEP (LARGE)

## (undated) DEVICE — DRAPE MAYO STAND 23"

## (undated) DEVICE — POSITIONER PINK PAD PIGAZZI SYSTEM

## (undated) DEVICE — GOWN XL

## (undated) DEVICE — ENDOCATCH 10MM SPECIMEN POUCH

## (undated) DEVICE — POSITIONER FOAM HEAD CRADLE (PINK)

## (undated) DEVICE — PACK ROBOTIC LIJ